# Patient Record
Sex: FEMALE | Race: OTHER | HISPANIC OR LATINO | Employment: FULL TIME | URBAN - METROPOLITAN AREA
[De-identification: names, ages, dates, MRNs, and addresses within clinical notes are randomized per-mention and may not be internally consistent; named-entity substitution may affect disease eponyms.]

---

## 2019-10-21 ENCOUNTER — HOSPITAL ENCOUNTER (EMERGENCY)
Facility: HOSPITAL | Age: 29
Discharge: HOME | End: 2019-10-21
Attending: FAMILY MEDICINE
Payer: MEDICAID

## 2019-10-21 VITALS
WEIGHT: 90 LBS | SYSTOLIC BLOOD PRESSURE: 122 MMHG | OXYGEN SATURATION: 100 % | DIASTOLIC BLOOD PRESSURE: 64 MMHG | HEIGHT: 62 IN | TEMPERATURE: 98 F | BODY MASS INDEX: 16.56 KG/M2 | RESPIRATION RATE: 18 BRPM | HEART RATE: 62 BPM

## 2019-10-21 DIAGNOSIS — M79.641 HAND PAIN, RIGHT: Primary | ICD-10-CM

## 2019-10-21 LAB — B-HCG UR QL: NEGATIVE

## 2019-10-21 PROCEDURE — 81025 URINE PREGNANCY TEST: CPT | Mod: ER

## 2019-10-21 PROCEDURE — 99284 EMERGENCY DEPT VISIT MOD MDM: CPT | Mod: 25,ER

## 2019-10-21 PROCEDURE — 25000003 PHARM REV CODE 250: Mod: ER | Performed by: FAMILY MEDICINE

## 2019-10-21 RX ORDER — DICLOFENAC SODIUM 50 MG/1
50 TABLET, DELAYED RELEASE ORAL 2 TIMES DAILY PRN
Qty: 30 TABLET | Refills: 0 | Status: SHIPPED | OUTPATIENT
Start: 2019-10-21 | End: 2022-04-28 | Stop reason: CLARIF

## 2019-10-21 RX ORDER — KETOROLAC TROMETHAMINE 10 MG/1
10 TABLET, FILM COATED ORAL
Status: COMPLETED | OUTPATIENT
Start: 2019-10-21 | End: 2019-10-21

## 2019-10-21 RX ORDER — DICLOFENAC SODIUM 50 MG/1
50 TABLET, DELAYED RELEASE ORAL 2 TIMES DAILY PRN
Qty: 20 TABLET | Refills: 0 | Status: SHIPPED | OUTPATIENT
Start: 2019-10-21 | End: 2019-10-21 | Stop reason: SDUPTHER

## 2019-10-21 RX ADMIN — KETOROLAC TROMETHAMINE 10 MG: 10 TABLET, FILM COATED ORAL at 07:10

## 2019-10-21 NOTE — ED PROVIDER NOTES
Encounter Date: 10/21/2019       History     Chief Complaint   Patient presents with    Hand Pain     29 y/o F with right hand pain and swelling for 8 days. Pt took advil for pain, last dose this morning. 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     28-year-old female presents with right hand pain for last 8 days.  She has been working at a place where there is a lot of work with right hand squeezing.  No deformity.  Patient has been taking OTC pain medication but pain persisted.  Occasional numbness.  No injury or previous injury.    The history is provided by the patient.     Review of patient's allergies indicates:  Allergies no known allergies  History reviewed. No pertinent past medical history.  Past Surgical History:   Procedure Laterality Date     SECTION      CHOLECYSTECTOMY       History reviewed. No pertinent family history.  Social History     Tobacco Use    Smoking status: Never Smoker    Smokeless tobacco: Never Used   Substance Use Topics    Alcohol use: Never     Frequency: Never    Drug use: Never     Review of Systems   Constitutional: Negative for activity change, appetite change, chills and fever.   HENT: Negative for congestion, ear discharge, rhinorrhea, sinus pressure, sinus pain, sore throat and trouble swallowing.    Eyes: Negative for photophobia, pain, discharge, redness, itching and visual disturbance.   Respiratory: Negative for cough, chest tightness, shortness of breath and wheezing.    Cardiovascular: Negative for chest pain, palpitations and leg swelling.   Gastrointestinal: Negative for abdominal distention, abdominal pain, constipation, diarrhea, nausea and vomiting.   Genitourinary: Negative for dysuria, flank pain, frequency and hematuria.   Musculoskeletal: Negative for back pain, gait problem, neck pain and neck stiffness.   Skin: Negative for rash and wound.   Neurological: Negative for dizziness, tremors, seizures, syncope, speech  difficulty, weakness, light-headedness, numbness and headaches.   Psychiatric/Behavioral: Negative for behavioral problems, confusion, hallucinations and sleep disturbance. The patient is not nervous/anxious.    All other systems reviewed and are negative.      Physical Exam     Initial Vitals [10/21/19 1818]   BP Pulse Resp Temp SpO2   122/64 62 18 97.9 °F (36.6 °C) 100 %      MAP       --         Physical Exam    Nursing note and vitals reviewed.  Constitutional: She appears well-developed and well-nourished.   HENT:   Head: Normocephalic.   Eyes: Pupils are equal, round, and reactive to light.   Neck: Normal range of motion. Neck supple.   Cardiovascular: Normal rate, regular rhythm, normal heart sounds and intact distal pulses.   Pulmonary/Chest: Breath sounds normal.   Abdominal: Soft.   Musculoskeletal: Normal range of motion.        Right hand: She exhibits tenderness. She exhibits normal range of motion, no bony tenderness, normal capillary refill, no deformity, no laceration and no swelling. Normal sensation noted.        Left hand: She exhibits normal range of motion, no tenderness, normal capillary refill, no deformity, no laceration and no swelling. Normal sensation noted. Normal strength noted.   Diffuse right hand and wrist pain. Full range of movements.   Neurological: She is alert and oriented to person, place, and time. She has normal strength. GCS score is 15. GCS eye subscore is 4. GCS verbal subscore is 5. GCS motor subscore is 6.   Skin: Skin is warm. Capillary refill takes less than 2 seconds.         ED Course   Procedures  Labs Reviewed   PREGNANCY TEST, URINE RAPID          Imaging Results    None          Medical Decision Making:   Initial Assessment:   Complains of right hand hand pain since last week.  Patient claims to be working with right hand more than her regular.  Differential Diagnosis:   Wrist pain pain, muscle strain, overuse pain,  Neuropathy,    Clinical Tests:   Radiological  Study: Ordered and Reviewed  ED Management:  Normal x-ray of the hand.  Patient is prescribed diclofenac sodium as needed for pain. Ace wrap as needed.  Advised to follow up a primary care physician as needed.                      Clinical Impression:       ICD-10-CM ICD-9-CM   1. Hand pain, right M79.641 729.5         Disposition:   Disposition: Discharged  Condition: Stable                        Tra Ramirez MD  10/21/19 0044

## 2022-04-28 ENCOUNTER — HOSPITAL ENCOUNTER (EMERGENCY)
Facility: HOSPITAL | Age: 32
Discharge: HOME | End: 2022-04-28
Attending: EMERGENCY MEDICINE
Payer: MEDICAID

## 2022-04-28 VITALS
HEART RATE: 73 BPM | DIASTOLIC BLOOD PRESSURE: 60 MMHG | RESPIRATION RATE: 20 BRPM | WEIGHT: 100 LBS | HEIGHT: 62 IN | TEMPERATURE: 98 F | SYSTOLIC BLOOD PRESSURE: 112 MMHG | BODY MASS INDEX: 18.4 KG/M2 | OXYGEN SATURATION: 99 %

## 2022-04-28 DIAGNOSIS — M25.512 SHOULDER PAIN, LEFT: ICD-10-CM

## 2022-04-28 DIAGNOSIS — M25.512 LEFT SHOULDER PAIN: ICD-10-CM

## 2022-04-28 LAB
B-HCG UR QL: NEGATIVE
CTP QC/QA: YES

## 2022-04-28 PROCEDURE — 25000003 PHARM REV CODE 250: Performed by: NURSE PRACTITIONER

## 2022-04-28 PROCEDURE — 93010 ELECTROCARDIOGRAM REPORT: CPT | Mod: ,,, | Performed by: INTERNAL MEDICINE

## 2022-04-28 PROCEDURE — 99284 PR EMERGENCY DEPT VISIT,LEVEL IV: ICD-10-PCS | Mod: ,,, | Performed by: NURSE PRACTITIONER

## 2022-04-28 PROCEDURE — 99284 EMERGENCY DEPT VISIT MOD MDM: CPT | Mod: ,,, | Performed by: NURSE PRACTITIONER

## 2022-04-28 PROCEDURE — 93005 ELECTROCARDIOGRAM TRACING: CPT

## 2022-04-28 PROCEDURE — 99284 EMERGENCY DEPT VISIT MOD MDM: CPT | Mod: 25

## 2022-04-28 PROCEDURE — 93010 EKG 12-LEAD: ICD-10-PCS | Mod: ,,, | Performed by: INTERNAL MEDICINE

## 2022-04-28 PROCEDURE — 81025 URINE PREGNANCY TEST: CPT | Performed by: NURSE PRACTITIONER

## 2022-04-28 RX ORDER — IBUPROFEN 600 MG/1
600 TABLET ORAL
Status: COMPLETED | OUTPATIENT
Start: 2022-04-28 | End: 2022-04-28

## 2022-04-28 RX ORDER — CYCLOBENZAPRINE HCL 10 MG
10 TABLET ORAL 3 TIMES DAILY PRN
Qty: 15 TABLET | Refills: 0 | Status: SHIPPED | OUTPATIENT
Start: 2022-04-28 | End: 2022-05-03

## 2022-04-28 RX ORDER — IBUPROFEN 600 MG/1
600 TABLET ORAL EVERY 6 HOURS PRN
Qty: 20 TABLET | Refills: 0 | Status: SHIPPED | OUTPATIENT
Start: 2022-04-28

## 2022-04-28 RX ADMIN — IBUPROFEN 600 MG: 600 TABLET, FILM COATED ORAL at 02:04

## 2022-04-28 NOTE — ED NOTES
Karyn Medellin, an 31 y.o. female presents to the ED complaining of left sided scapula pain that is worsened by movement x3 days. 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 Pain does not radiate and pt describes pain as a pressure. Pt able to sleep comfortably with the left arm extended upwards and lifted behind her head. Took 2 advil this AM and pt unsure if pain improved.      Chief Complaint   Patient presents with    Flank Pain     X3 days. Denies N/V or diarrhea      Review of patient's allergies indicates:  No Known Allergies  No past medical history on file.

## 2022-04-28 NOTE — ED PROVIDER NOTES
Chief complaint:  Flank Pain (X3 days. Denies N/V or diarrhea )      HPI:  Karyn Medellin is a 31 y.o. female presenting to the emergency dept today for evaluation of pain to her left shoulder- points to the scapula as the location of pain. Her pain began 3 days ago and is present with movment. She denies any trauma although states she does heavy lifting and is on her feet all day. She denies any chest pain or shortness of breath. Denies cough. Denies fever or chills. The pain is aggravated by movement. She is non on hormones. Denies swelling or leg pain. She is a social smoker.  Denies hx of blood clots or of heart disease. No other problems or concerns voiced at this time.      Review of patient's allergies indicates:  No Known Allergies    No current facility-administered medications on file prior to encounter.     No current outpatient medications on file prior to encounter.       PMH:  As per HPI and below:  History reviewed. No pertinent past medical history.  Past Surgical History:   Procedure Laterality Date     SECTION      CHOLECYSTECTOMY         Social History     Socioeconomic History    Marital status: Single   Tobacco Use    Smoking status: Current Some Day Smoker     Types: Cigarettes    Smokeless tobacco: Never Used   Substance and Sexual Activity    Alcohol use: Yes     Comment: socially    Drug use: Never       No family history on file.    Review of Systems  As per HPI and Below  Constitutional: Negative for chills and fever.   HENT: Negative for congestion and sinus pressure.    Eyes: Negative for visual disturbance.   Respiratory: Negative for cough, chest tightness and shortness of breath.    Cardiovascular: Negative for chest pain.   Gastrointestinal: Negative for abdominal pain, diarrhea, nausea and vomiting.   Genitourinary: Negative for flank pain. Negative for dysuria.  Musculoskeletal: Positive for arthralgia.  Skin: Negative for rash and wound.   Neurological: Negative  for dizziness. Negative for weakness and numbness.   Psychiatric/Behavioral: Negative for confusion.       Physical Exam:    Vitals:    04/28/22 1547   BP: 112/60   Pulse: 73   Resp: 20   Temp: 98 °F (36.7 °C)     Nursing note and vitals reviewed.  Constitutional: Patient appears well-developed and well-nourished. Not diaphoretic. No distress.   HENT:   Head: Normocephalic and atraumatic.   Eyes: Conjunctivae are normal. No scleral icterus.   Neck: Normal range of motion. Neck supple.   Cardiovascular: Normal rate, regular rhythm and normal heart sounds.   Pulmonary/Chest: Breath sounds normal. No respiratory distress.  Abdominal: Soft. There is no tenderness.   Musculoskeletal: Pt has TTP of the left scapula. No swelling, erythema or warmth. Normal joint ROM but pain is elicited with movement of the left shoulder. No bony spinal tenderness.  Neurological: Alert and oriented to person, place, and time. Normal strength. No sensory deficit.   Skin: Skin is warm and dry. No rash noted. No erythema. No pallor.   Psychiatric: Normal mood and affect. Thought content normal.       Labs Reviewed   POCT URINE PREGNANCY       Imaging Results          X-Ray Shoulder Trauma Left (Final result)  Result time 04/28/22 15:04:26    Final result by Walter Wilson MD (04/28/22 15:04:26)                 Impression:      No acute displaced fracture-dislocation identified.      Electronically signed by: Walter Wilson MD  Date:    04/28/2022  Time:    15:04             Narrative:    EXAMINATION:  XR SHOULDER TRAUMA 3 VIEW LEFT    CLINICAL HISTORY:  Pain in left shoulder    TECHNIQUE:  Three views of the left shoulder were performed.    COMPARISON  None    FINDINGS:  Bones are well mineralized. Overall alignment is within normal limits.  No displaced fracture, dislocation or destructive osseous process. Joint spaces appear relatively maintained.  No subcutaneous emphysema or radiodense retained foreign body.  Left lung apex is clear.                                 No results found.    Procedures      MDM:    31 y.o. female presenting for evaluation of left shoulder pain x 3 days that is aggravated with movement. 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 Pain is easily reproducible. No CP or SOB. She is afebrile and non toxic appearing. Vital signs at triage show tachycardia but patient maintained a normal HR for the duration of her ED stay so I am unsure if this was an error. Her O2 sat is 99% on room air. I do not suspect PE, ACS, PNA other emergent condition.    X-ray of the left shoulder is negative. EKG unremarkable.    She is stable for dc home with outpatient follow up with PCP. Discharged home with rx for flexeril and ibuprofen.           Diagnoses:  Diagnoses of Shoulder pain, left and Left shoulder pain were pertinent to this visit.         Deidra Villeda NP  04/29/22 6857

## 2022-08-28 ENCOUNTER — HOSPITAL ENCOUNTER (EMERGENCY)
Facility: HOSPITAL | Age: 32
Discharge: HOME | End: 2022-08-28
Attending: EMERGENCY MEDICINE
Payer: MEDICAID

## 2022-08-28 VITALS
HEART RATE: 70 BPM | BODY MASS INDEX: 17.85 KG/M2 | DIASTOLIC BLOOD PRESSURE: 56 MMHG | OXYGEN SATURATION: 99 % | HEIGHT: 62 IN | WEIGHT: 97 LBS | SYSTOLIC BLOOD PRESSURE: 111 MMHG | RESPIRATION RATE: 18 BRPM | TEMPERATURE: 98 F

## 2022-08-28 DIAGNOSIS — A08.4 VIRAL GASTROENTERITIS: Primary | ICD-10-CM

## 2022-08-28 DIAGNOSIS — A05.9 FOOD POISONING: ICD-10-CM

## 2022-08-28 LAB
ALBUMIN SERPL BCP-MCNC: 4.8 G/DL (ref 3.5–5.2)
ALP SERPL-CCNC: 54 U/L (ref 55–135)
ALT SERPL W/O P-5'-P-CCNC: 25 U/L (ref 10–44)
ANION GAP SERPL CALC-SCNC: 17 MMOL/L (ref 8–16)
AST SERPL-CCNC: 47 U/L (ref 10–40)
B-HCG UR QL: NEGATIVE
BASOPHILS # BLD AUTO: 0.03 K/UL (ref 0–0.2)
BASOPHILS NFR BLD: 0.3 % (ref 0–1.9)
BILIRUB SERPL-MCNC: 1.9 MG/DL (ref 0.1–1)
BUN SERPL-MCNC: 18 MG/DL (ref 6–20)
CALCIUM SERPL-MCNC: 9.8 MG/DL (ref 8.7–10.5)
CHLORIDE SERPL-SCNC: 107 MMOL/L (ref 95–110)
CO2 SERPL-SCNC: 17 MMOL/L (ref 23–29)
CREAT SERPL-MCNC: 0.8 MG/DL (ref 0.5–1.4)
CTP QC/QA: YES
DIFFERENTIAL METHOD: ABNORMAL
EOSINOPHIL # BLD AUTO: 0 K/UL (ref 0–0.5)
EOSINOPHIL NFR BLD: 0 % (ref 0–8)
ERYTHROCYTE [DISTWIDTH] IN BLOOD BY AUTOMATED COUNT: 12.2 % (ref 11.5–14.5)
EST. GFR  (NO RACE VARIABLE): >60 ML/MIN/1.73 M^2
GLUCOSE SERPL-MCNC: 66 MG/DL (ref 70–110)
HCT VFR BLD AUTO: 44.1 % (ref 37–48.5)
HGB BLD-MCNC: 14.3 G/DL (ref 12–16)
IMM GRANULOCYTES # BLD AUTO: 0.02 K/UL (ref 0–0.04)
IMM GRANULOCYTES NFR BLD AUTO: 0.2 % (ref 0–0.5)
LIPASE SERPL-CCNC: 9 U/L (ref 4–60)
LYMPHOCYTES # BLD AUTO: 1 K/UL (ref 1–4.8)
LYMPHOCYTES NFR BLD: 9.8 % (ref 18–48)
MCH RBC QN AUTO: 32.6 PG (ref 27–31)
MCHC RBC AUTO-ENTMCNC: 32.4 G/DL (ref 32–36)
MCV RBC AUTO: 101 FL (ref 82–98)
MONOCYTES # BLD AUTO: 0.3 K/UL (ref 0.3–1)
MONOCYTES NFR BLD: 2.5 % (ref 4–15)
NEUTROPHILS # BLD AUTO: 8.6 K/UL (ref 1.8–7.7)
NEUTROPHILS NFR BLD: 87.2 % (ref 38–73)
NRBC BLD-RTO: 0 /100 WBC
PLATELET # BLD AUTO: 195 K/UL (ref 150–450)
PMV BLD AUTO: 10.2 FL (ref 9.2–12.9)
POTASSIUM SERPL-SCNC: 4 MMOL/L (ref 3.5–5.1)
PROT SERPL-MCNC: 8.3 G/DL (ref 6–8.4)
RBC # BLD AUTO: 4.39 M/UL (ref 4–5.4)
SODIUM SERPL-SCNC: 141 MMOL/L (ref 136–145)
WBC # BLD AUTO: 9.81 K/UL (ref 3.9–12.7)

## 2022-08-28 PROCEDURE — 99284 EMERGENCY DEPT VISIT MOD MDM: CPT | Mod: ,,, | Performed by: EMERGENCY MEDICINE

## 2022-08-28 PROCEDURE — 63600175 PHARM REV CODE 636 W HCPCS: Performed by: EMERGENCY MEDICINE

## 2022-08-28 PROCEDURE — 25000003 PHARM REV CODE 250: Performed by: EMERGENCY MEDICINE

## 2022-08-28 PROCEDURE — 85025 COMPLETE CBC W/AUTO DIFF WBC: CPT | Performed by: EMERGENCY MEDICINE

## 2022-08-28 PROCEDURE — 96374 THER/PROPH/DIAG INJ IV PUSH: CPT

## 2022-08-28 PROCEDURE — 99284 PR EMERGENCY DEPT VISIT,LEVEL IV: ICD-10-PCS | Mod: ,,, | Performed by: EMERGENCY MEDICINE

## 2022-08-28 PROCEDURE — 87389 HIV-1 AG W/HIV-1&-2 AB AG IA: CPT | Performed by: PHYSICIAN ASSISTANT

## 2022-08-28 PROCEDURE — 86803 HEPATITIS C AB TEST: CPT | Performed by: PHYSICIAN ASSISTANT

## 2022-08-28 PROCEDURE — 80053 COMPREHEN METABOLIC PANEL: CPT | Performed by: EMERGENCY MEDICINE

## 2022-08-28 PROCEDURE — 96361 HYDRATE IV INFUSION ADD-ON: CPT

## 2022-08-28 PROCEDURE — 96375 TX/PRO/DX INJ NEW DRUG ADDON: CPT

## 2022-08-28 PROCEDURE — 83690 ASSAY OF LIPASE: CPT | Performed by: EMERGENCY MEDICINE

## 2022-08-28 PROCEDURE — 99284 EMERGENCY DEPT VISIT MOD MDM: CPT

## 2022-08-28 PROCEDURE — 81025 URINE PREGNANCY TEST: CPT | Performed by: EMERGENCY MEDICINE

## 2022-08-28 RX ORDER — HYOSCYAMINE SULFATE 0.12 MG/1
0.25 TABLET SUBLINGUAL EVERY 4 HOURS PRN
Qty: 30 TABLET | Refills: 0 | Status: SHIPPED | OUTPATIENT
Start: 2022-08-28

## 2022-08-28 RX ORDER — PROCHLORPERAZINE EDISYLATE 5 MG/ML
5 INJECTION INTRAMUSCULAR; INTRAVENOUS
Status: COMPLETED | OUTPATIENT
Start: 2022-08-28 | End: 2022-08-28

## 2022-08-28 RX ORDER — HYOSCYAMINE SULFATE 0.12 MG/1
0.25 TABLET SUBLINGUAL
Status: DISCONTINUED | OUTPATIENT
Start: 2022-08-28 | End: 2022-08-28 | Stop reason: HOSPADM

## 2022-08-28 RX ORDER — KETOROLAC TROMETHAMINE 30 MG/ML
10 INJECTION, SOLUTION INTRAMUSCULAR; INTRAVENOUS
Status: COMPLETED | OUTPATIENT
Start: 2022-08-28 | End: 2022-08-28

## 2022-08-28 RX ORDER — ONDANSETRON 4 MG/1
8 TABLET, ORALLY DISINTEGRATING ORAL EVERY 6 HOURS PRN
Qty: 30 TABLET | Refills: 0 | Status: SHIPPED | OUTPATIENT
Start: 2022-08-28

## 2022-08-28 RX ADMIN — SODIUM CHLORIDE 1000 ML: 0.9 INJECTION, SOLUTION INTRAVENOUS at 06:08

## 2022-08-28 RX ADMIN — PROCHLORPERAZINE EDISYLATE 5 MG: 5 INJECTION INTRAMUSCULAR; INTRAVENOUS at 06:08

## 2022-08-28 RX ADMIN — KETOROLAC TROMETHAMINE 10 MG: 30 INJECTION, SOLUTION INTRAMUSCULAR at 06:08

## 2022-08-28 NOTE — ED PROVIDER NOTES
Encounter Date: 2022       History     Chief Complaint   Patient presents with    Emesis     Pt c/o N/V/D x 4 hours. Denies further complaints. Pt is concerned she may have food poisoning.      Pleasant 31-year-old female, denies medical history presents the ER for evaluation of 4-5 hours of nausea vomiting diarrhea.  Reports decreased p.o. intake unable tolerate fluids.  Complaining of diffuse whole abdominal pain and cramping.  She believes she may have food poisoning.  She never had symptoms this severe before.  She came to the ER for further evaluation.    Review of patient's allergies indicates:  No Known Allergies  History reviewed. No pertinent past medical history.  Past Surgical History:   Procedure Laterality Date     SECTION      CHOLECYSTECTOMY       No family history on file.  Social History     Tobacco Use    Smoking status: Some Days     Types: Cigarettes    Smokeless tobacco: Never   Substance Use Topics    Alcohol use: Yes     Alcohol/week: 2.0 standard drinks     Types: 2 Standard drinks or equivalent per week     Comment: socially    Drug use: Never     Review of Systems   Constitutional:  Positive for chills and fatigue.   Respiratory:  Negative for shortness of breath.    Gastrointestinal:  Positive for abdominal pain, diarrhea, nausea and vomiting.   All other systems reviewed and are negative.    Physical Exam     Initial Vitals [22 0510]   BP Pulse Resp Temp SpO2   (!) 111/56 70 18 98.2 °F (36.8 °C) 99 %      MAP       --         Physical Exam    Nursing note and vitals reviewed.  Constitutional: She appears well-developed and well-nourished.   HENT:   Head: Normocephalic and atraumatic.   Eyes: Pupils are equal, round, and reactive to light.   Neck:   Normal range of motion.  Cardiovascular:  Normal rate, regular rhythm and normal heart sounds.           Pulmonary/Chest: Breath sounds normal. No respiratory distress.   Abdominal: Abdomen is soft. She exhibits no  distension. There is no abdominal tenderness.   Musculoskeletal:         General: Normal range of motion.      Cervical back: Normal range of motion.     Neurological: She is alert and oriented to person, place, and time. She has normal strength. GCS score is 15. GCS eye subscore is 4. GCS verbal subscore is 5. GCS motor subscore is 6.   Skin: Skin is warm and dry. Capillary refill takes less than 2 seconds.   Psychiatric: She has a normal mood and affect. Thought content normal.       ED Course   Procedures  Labs Reviewed   CBC W/ AUTO DIFFERENTIAL - Abnormal; Notable for the following components:       Result Value     (*)     MCH 32.6 (*)     Gran # (ANC) 8.6 (*)     Gran % 87.2 (*)     Lymph % 9.8 (*)     Mono % 2.5 (*)     All other components within normal limits   COMPREHENSIVE METABOLIC PANEL - Abnormal; Notable for the following components:    CO2 17 (*)     Glucose 66 (*)     Total Bilirubin 1.9 (*)     Alkaline Phosphatase 54 (*)     AST 47 (*)     Anion Gap 17 (*)     All other components within normal limits   LIPASE   HIV 1 / 2 ANTIBODY   HEPATITIS C ANTIBODY   POCT URINE PREGNANCY          Imaging Results    None          Medications   sodium chloride 0.9% bolus 1,000 mL (1,000 mLs Intravenous New Bag 8/28/22 0602)   hyoscyamine SL tablet 0.25 mg (has no administration in time range)   prochlorperazine injection Soln 5 mg (5 mg Intravenous Given 8/28/22 0600)   ketorolac injection 9.999 mg (9.999 mg Intravenous Given 8/28/22 0647)     Medical Decision Making:   Initial Assessment:   Pleasant 31-year-old female presents the ER for evaluation of less than 1 day of acute gastroenteritis like symptoms.  Appears uncomfortable secondary to pain.  Nonfocal abdominal exam.  Will plan symptomatic support some basic blood work hopefully discharge with improvement in symptoms.           ED Course as of 08/28/22 0649   Sun Aug 28, 2022   0633 Resting bed no acute distress patient reports she started to  feel much better.  Awaiting rest of blood work and reassessment. [SE]   0641 Patient resting comfortably in bed, no acute distress feeling much better.  Labs reviewed no leukocytosis.  Patient is noted to have slight elevation in liver enzymes.  She did endorse moderate drinking tonight.  She reports she status post cholecystectomy.  Slight hypoglycemia likely from nausea vomiting decreased p.o. intake.  Will plan discharge home symptomatic support medications.  Return precautions discussed with patient understood agreed with and patient will be discharged. [SE]      ED Course User Index  [SE] Darian Khoury MD             Clinical Impression:   Final diagnoses:  [A08.4] Viral gastroenteritis (Primary)  [A05.9] Food poisoning      ED Disposition Condition    Discharge Stable          ED Prescriptions       Medication Sig Dispense Start Date End Date Auth. Provider    ondansetron (ZOFRAN-ODT) 4 MG TbDL Take 2 tablets (8 mg total) by mouth every 6 (six) hours as needed (n/v). 30 tablet 8/28/2022 -- Darian Khoury MD    hyoscyamine (LEVSIN/SL) 0.125 mg Subl Place 2 tablets (0.25 mg total) under the tongue every 4 (four) hours as needed (Abdominal cramping). 30 tablet 8/28/2022 -- Darian Khoury MD          Follow-up Information       Follow up With Specialties Details Why Contact Info Additional Information    [facility] Primary [facility] Internal Medicine Schedule an appointment as soon as possible for a visit  As needed [id number]  [facility] [address] [phone] [facility] Please park in surface lot and check in at central registration desk             Darian Khoury MD  08/28/22 3596

## 2022-08-28 NOTE — ED TRIAGE NOTES
Pt c/o N/V/D over the last few hrs as well as epigastric pain 7/10. Pt ate banana salad around 9 pm and woke up with N/V. Pt denies taking anything for pain or nausea. Denies SOB, CP, Dizziness.

## 2022-08-29 LAB
HCV AB SERPL QL IA: NEGATIVE
HIV 1+2 AB+HIV1 P24 AG SERPL QL IA: NEGATIVE

## 2023-08-22 ENCOUNTER — HOSPITAL ENCOUNTER (EMERGENCY)
Facility: HOSPITAL | Age: 33
Discharge: HOME | End: 2023-08-22
Attending: STUDENT IN AN ORGANIZED HEALTH CARE EDUCATION/TRAINING PROGRAM
Payer: MEDICAID

## 2023-08-22 VITALS
BODY MASS INDEX: 18.22 KG/M2 | OXYGEN SATURATION: 100 % | WEIGHT: 99 LBS | HEART RATE: 74 BPM | RESPIRATION RATE: 16 BRPM | TEMPERATURE: 100 F | DIASTOLIC BLOOD PRESSURE: 70 MMHG | SYSTOLIC BLOOD PRESSURE: 115 MMHG | HEIGHT: 62 IN

## 2023-08-22 DIAGNOSIS — H61.21 IMPACTED CERUMEN OF RIGHT EAR: Primary | ICD-10-CM

## 2023-08-22 PROCEDURE — 69209 REMOVE IMPACTED EAR WAX UNI: CPT | Mod: RT

## 2023-08-22 PROCEDURE — 99282 EMERGENCY DEPT VISIT SF MDM: CPT

## 2023-08-22 PROCEDURE — 25000003 PHARM REV CODE 250: Performed by: PHYSICIAN ASSISTANT

## 2023-08-22 RX ORDER — DOCUSATE SODIUM 50 MG/5ML
100 LIQUID ORAL
Status: COMPLETED | OUTPATIENT
Start: 2023-08-22 | End: 2023-08-22

## 2023-08-22 RX ADMIN — DOCUSATE SODIUM LIQUID 100 MG: 100 LIQUID ORAL at 11:08

## 2023-08-22 NOTE — ED NOTES
Patient identifiers verified and correct for  MS Medellin  C/C:  Right ear pain SEE NN  APPEARANCE: awake and alert in NAD. PAIN  9/10  SKIN: warm, dry and intact. No breakdown or bruising.  MUSCULOSKELETAL: Patient moving all extremities spontaneously, no obvious swelling or deformities noted. Ambulates independently.  RESPIRATORY: Denies shortness of breath.Respirations unlabored.   CARDIAC: Denies CP, 2+ distal pulses; no peripheral edema  ABDOMEN: S/ND/NT, Denies nausea  : voids spontaneously, denies difficulty  Neurologic: AAO x 4; follows commands equal strength in all extremities; denies numbness/tingling. Denies dizziness Denies new weakness

## 2023-08-22 NOTE — DISCHARGE INSTRUCTIONS
Your earwax was removed from your ear. Follow up with your primary doctor or return to the ER for any new or worsening symptoms.

## 2023-08-22 NOTE — ED NOTES
"Patient states she was swimming Sunday, currently with right ear " blocked" and today with pain, Advil this am   "

## 2023-08-22 NOTE — ED PROVIDER NOTES
Encounter Date: 2023       History     Chief Complaint   Patient presents with    Otalgia     Went swimming     32-year-old female with no significant past medical history presents to the emergency department with chief complaint of right ear pain that began after swimming in a swimming pool 4 days ago.  Pain has been constant.  No fever.  Her pain is worse when she eats.  No dental pain or sore throat.  No trismus.  She has tried over-the-counter medication without improvement of her symptoms.  Denies symptoms of this nature in the past.  Denies worsening or alleviating factors.      Review of patient's allergies indicates:  No Known Allergies  History reviewed. No pertinent past medical history.  Past Surgical History:   Procedure Laterality Date     SECTION      CHOLECYSTECTOMY       History reviewed. No pertinent family history.  Social History     Tobacco Use    Smoking status: Some Days     Current packs/day: 0.00     Types: Cigarettes    Smokeless tobacco: Never   Substance Use Topics    Alcohol use: Yes     Alcohol/week: 2.0 standard drinks of alcohol     Types: 2 Standard drinks or equivalent per week     Comment: socially    Drug use: Never     Review of Systems   HENT:  Positive for ear pain.        Physical Exam     Initial Vitals [23 0922]   BP Pulse Resp Temp SpO2   115/70 74 16 99.5 °F (37.5 °C) 100 %      MAP       --         Physical Exam    Vitals reviewed.  Constitutional: She appears well-developed and well-nourished. She is not diaphoretic. No distress.   HENT:   Head: Normocephalic and atraumatic.   Mouth/Throat: Oropharynx is clear and moist.   Cerumen impaction to right ear   Eyes: Conjunctivae and EOM are normal. Pupils are equal, round, and reactive to light.   Neck: Neck supple.   Normal range of motion.  Cardiovascular:  Normal rate.           Pulmonary/Chest: No respiratory distress.   Abdominal: She exhibits no distension.   Musculoskeletal:         General: Normal  range of motion.      Cervical back: Normal range of motion and neck supple.     Neurological: She is alert and oriented to person, place, and time. She has normal strength. No cranial nerve deficit or sensory deficit. GCS score is 15. GCS eye subscore is 4. GCS verbal subscore is 5. GCS motor subscore is 6.   Skin: Skin is warm and dry. Capillary refill takes less than 2 seconds.   Psychiatric: She has a normal mood and affect. Her behavior is normal. Judgment and thought content normal.         ED Course   Procedures  Labs Reviewed - No data to display       Imaging Results    None          Medications   docusate 50 mg/5 mL liquid 100 mg (100 mg Otic Given 8/22/23 1123)     Medical Decision Making  Emergent evaluation of a 32 y.o. female presenting to the emergency department complaining of right ear pain that began 4 days ago after swimming in a swimming pool. Patient is afebrile, hemodynamically stable, and non toxic appearing.     On physical exam, the patient has a cerumen impaction on the right side.  I can not visualize the tympanic membrane.  Will attempt Colace and cerumen extraction for better visualization.    Ear irrigated by nursing staff with complete resolution of patient's symptoms. Her external auditory canal appears slightly irritated from the irrigation. No evidence of otitis externa or otitis media. No mastoid TTP to suggest mastoiditis. Her symptoms have completely resolved now that cerumen is removed.   Return precautions. All questions answered.   The patient was instructed to follow up with a primary care provider or to return to the emergency department for worsening symptoms. The treatment plan was discussed with the patient who demonstrated understanding and comfort with plan.     Problems Addressed:  Impacted cerumen of right ear: acute illness or injury                               Clinical Impression:   Final diagnoses:  [H61.21] Impacted cerumen of right ear (Primary)        ED  Disposition Condition    Discharge Stable          ED Prescriptions    None       Follow-up Information       Follow up With Specialties Details Why Contact Info    [facility] - Emergency Dept Emergency Medicine Go to  If symptoms worsen [phone]  [facility] [address]  [phone]    [facility] - Family Family Medicine Schedule an appointment as soon as possible for a visit in 1 week  [address] [facility] [address]  [phone]               Loan North PA-C  08/22/23 1951

## 2023-08-22 NOTE — ED NOTES
Patient lying flat on stretcher, inserted Colace in ear, unable to inset all 10 ml Colace, PA aware, update to [facility] staff